# Patient Record
Sex: FEMALE | Race: BLACK OR AFRICAN AMERICAN | NOT HISPANIC OR LATINO | URBAN - METROPOLITAN AREA
[De-identification: names, ages, dates, MRNs, and addresses within clinical notes are randomized per-mention and may not be internally consistent; named-entity substitution may affect disease eponyms.]

---

## 2023-09-07 ENCOUNTER — OFFICE VISIT (OUTPATIENT)
Dept: PRIMARY CARE | Facility: CLINIC | Age: 83
End: 2023-09-07
Payer: MEDICARE

## 2023-09-07 VITALS
HEART RATE: 50 BPM | TEMPERATURE: 97.4 F | BODY MASS INDEX: 27.65 KG/M2 | WEIGHT: 141.6 LBS | SYSTOLIC BLOOD PRESSURE: 153 MMHG | DIASTOLIC BLOOD PRESSURE: 58 MMHG

## 2023-09-07 DIAGNOSIS — F32.A DEPRESSION, UNSPECIFIED DEPRESSION TYPE: ICD-10-CM

## 2023-09-07 DIAGNOSIS — K21.9 GASTROESOPHAGEAL REFLUX DISEASE WITHOUT ESOPHAGITIS: ICD-10-CM

## 2023-09-07 DIAGNOSIS — E11.9 DIABETES MELLITUS TYPE 2 WITHOUT RETINOPATHY (MULTI): ICD-10-CM

## 2023-09-07 DIAGNOSIS — I42.9 CARDIOMYOPATHY, UNSPECIFIED TYPE (MULTI): ICD-10-CM

## 2023-09-07 DIAGNOSIS — M54.50 CHRONIC LOW BACK PAIN WITHOUT SCIATICA, UNSPECIFIED BACK PAIN LATERALITY: ICD-10-CM

## 2023-09-07 DIAGNOSIS — I50.42 CHRONIC COMBINED SYSTOLIC AND DIASTOLIC HEART FAILURE WITH PRESERVED EJECTION FRACTION (MULTI): ICD-10-CM

## 2023-09-07 DIAGNOSIS — J44.1 ACUTE EXACERBATION OF COPD WITH ASTHMA (MULTI): ICD-10-CM

## 2023-09-07 DIAGNOSIS — G89.29 CHRONIC LOW BACK PAIN WITHOUT SCIATICA, UNSPECIFIED BACK PAIN LATERALITY: ICD-10-CM

## 2023-09-07 DIAGNOSIS — I10 UNCONTROLLED HYPERTENSION: ICD-10-CM

## 2023-09-07 DIAGNOSIS — C34.90 MALIGNANT NEOPLASM OF LUNG, UNSPECIFIED LATERALITY, UNSPECIFIED PART OF LUNG (MULTI): ICD-10-CM

## 2023-09-07 DIAGNOSIS — I27.20 PULMONARY HYPERTENSION (MULTI): Primary | ICD-10-CM

## 2023-09-07 DIAGNOSIS — J45.901 ACUTE EXACERBATION OF COPD WITH ASTHMA (MULTI): ICD-10-CM

## 2023-09-07 PROBLEM — J44.9 COPD (CHRONIC OBSTRUCTIVE PULMONARY DISEASE) (MULTI): Status: ACTIVE | Noted: 2023-09-07

## 2023-09-07 PROBLEM — R56.9 SEIZURE (MULTI): Status: ACTIVE | Noted: 2023-09-07

## 2023-09-07 PROBLEM — G47.00 INSOMNIA: Status: ACTIVE | Noted: 2023-09-07

## 2023-09-07 PROBLEM — M10.9 ACUTE GOUTY ARTHROPATHY: Status: ACTIVE | Noted: 2023-09-07

## 2023-09-07 PROBLEM — D50.9 IRON DEFICIENCY ANEMIA: Status: ACTIVE | Noted: 2023-09-07

## 2023-09-07 PROBLEM — N18.5 STAGE 5 CHRONIC KIDNEY DISEASE (MULTI): Status: ACTIVE | Noted: 2023-09-07

## 2023-09-07 PROCEDURE — 3078F DIAST BP <80 MM HG: CPT | Performed by: FAMILY MEDICINE

## 2023-09-07 PROCEDURE — 1036F TOBACCO NON-USER: CPT | Performed by: FAMILY MEDICINE

## 2023-09-07 PROCEDURE — 3077F SYST BP >= 140 MM HG: CPT | Performed by: FAMILY MEDICINE

## 2023-09-07 PROCEDURE — 1126F AMNT PAIN NOTED NONE PRSNT: CPT | Performed by: FAMILY MEDICINE

## 2023-09-07 PROCEDURE — 1159F MED LIST DOCD IN RCRD: CPT | Performed by: FAMILY MEDICINE

## 2023-09-07 PROCEDURE — 99214 OFFICE O/P EST MOD 30 MIN: CPT | Performed by: FAMILY MEDICINE

## 2023-09-07 RX ORDER — EPINEPHRINE 0.3 MG/.3ML
0.3 INJECTION SUBCUTANEOUS
COMMUNITY
Start: 2022-02-14 | End: 2023-09-07 | Stop reason: SDUPTHER

## 2023-09-07 RX ORDER — MONTELUKAST SODIUM 10 MG/1
1 TABLET ORAL DAILY
COMMUNITY
Start: 2013-07-11 | End: 2023-09-07 | Stop reason: SDUPTHER

## 2023-09-07 RX ORDER — ISOSORBIDE MONONITRATE 30 MG/1
1 TABLET, EXTENDED RELEASE ORAL DAILY
COMMUNITY
Start: 2022-07-14 | End: 2023-09-07 | Stop reason: SDUPTHER

## 2023-09-07 RX ORDER — POTASSIUM CHLORIDE 750 MG/1
1 TABLET, EXTENDED RELEASE ORAL DAILY
COMMUNITY
Start: 2022-08-09 | End: 2023-09-07 | Stop reason: SDUPTHER

## 2023-09-07 RX ORDER — ASPIRIN 81 MG/1
1 TABLET ORAL DAILY
COMMUNITY
Start: 2019-03-11 | End: 2023-09-07 | Stop reason: SDUPTHER

## 2023-09-07 RX ORDER — AMLODIPINE BESYLATE 10 MG/1
1 TABLET ORAL DAILY
COMMUNITY
Start: 2017-08-04 | End: 2023-09-07 | Stop reason: SDUPTHER

## 2023-09-07 RX ORDER — ROSUVASTATIN CALCIUM 40 MG/1
40 TABLET, COATED ORAL NIGHTLY
Qty: 90 TABLET | Refills: 1 | Status: SHIPPED | OUTPATIENT
Start: 2023-09-07 | End: 2024-01-28

## 2023-09-07 RX ORDER — SITAGLIPTIN 100 MG/1
1 TABLET, FILM COATED ORAL DAILY
COMMUNITY
Start: 2013-07-11 | End: 2023-09-07 | Stop reason: DRUGHIGH

## 2023-09-07 RX ORDER — CARVEDILOL 12.5 MG/1
12.5 TABLET ORAL 2 TIMES DAILY
Qty: 180 TABLET | Refills: 1 | Status: SHIPPED | OUTPATIENT
Start: 2023-09-07 | End: 2024-01-28

## 2023-09-07 RX ORDER — DIPHENHYDRAMINE HCL 50 MG
50 CAPSULE ORAL 2 TIMES DAILY
Qty: 90 CAPSULE | Refills: 1 | Status: SHIPPED | OUTPATIENT
Start: 2023-09-07 | End: 2024-03-31

## 2023-09-07 RX ORDER — DIPHENHYDRAMINE HCL 50 MG
1 CAPSULE ORAL 2 TIMES DAILY
COMMUNITY
Start: 2021-09-27 | End: 2023-09-07 | Stop reason: SDUPTHER

## 2023-09-07 RX ORDER — FUROSEMIDE 40 MG/1
TABLET ORAL
COMMUNITY
Start: 2022-07-14 | End: 2023-09-07 | Stop reason: SDUPTHER

## 2023-09-07 RX ORDER — SITAGLIPTIN 100 MG/1
100 TABLET, FILM COATED ORAL DAILY
Qty: 90 TABLET | Refills: 1 | Status: SHIPPED | OUTPATIENT
Start: 2023-09-07 | End: 2023-09-07 | Stop reason: SDUPTHER

## 2023-09-07 RX ORDER — ALBUTEROL SULFATE 0.83 MG/ML
2.5 SOLUTION RESPIRATORY (INHALATION) EVERY 4 HOURS PRN
Qty: 75 ML | Refills: 5 | Status: SHIPPED | OUTPATIENT
Start: 2023-09-07 | End: 2024-01-28

## 2023-09-07 RX ORDER — AMLODIPINE BESYLATE 10 MG/1
10 TABLET ORAL DAILY
Qty: 90 TABLET | Refills: 1 | Status: SHIPPED | OUTPATIENT
Start: 2023-09-07 | End: 2024-01-28

## 2023-09-07 RX ORDER — ROSUVASTATIN CALCIUM 40 MG/1
1 TABLET, COATED ORAL NIGHTLY
COMMUNITY
Start: 2021-04-09 | End: 2023-09-07 | Stop reason: SDUPTHER

## 2023-09-07 RX ORDER — ISOSORBIDE MONONITRATE 30 MG/1
30 TABLET, EXTENDED RELEASE ORAL DAILY
Qty: 90 TABLET | Refills: 1 | Status: SHIPPED | OUTPATIENT
Start: 2023-09-07 | End: 2024-01-28

## 2023-09-07 RX ORDER — FUROSEMIDE 40 MG/1
40 TABLET ORAL DAILY
Qty: 90 TABLET | Refills: 1 | Status: SHIPPED | OUTPATIENT
Start: 2023-09-07 | End: 2024-01-28

## 2023-09-07 RX ORDER — EPINEPHRINE 0.3 MG/.3ML
0.3 INJECTION SUBCUTANEOUS AS NEEDED
Qty: 2 ML | Refills: 1 | Status: SHIPPED | OUTPATIENT
Start: 2023-09-07 | End: 2023-11-10

## 2023-09-07 RX ORDER — HYDROCODONE BITARTRATE AND ACETAMINOPHEN 7.5; 325 MG/1; MG/1
1 TABLET ORAL EVERY 6 HOURS PRN
Qty: 90 TABLET | Refills: 0 | Status: SHIPPED | OUTPATIENT
Start: 2023-09-07 | End: 2024-02-20 | Stop reason: SDUPTHER

## 2023-09-07 RX ORDER — SITAGLIPTIN 100 MG/1
100 TABLET, FILM COATED ORAL DAILY
Qty: 90 TABLET | Refills: 1 | Status: SHIPPED | OUTPATIENT
Start: 2023-09-07 | End: 2024-01-28

## 2023-09-07 RX ORDER — MONTELUKAST SODIUM 10 MG/1
10 TABLET ORAL DAILY
Qty: 90 TABLET | Refills: 1 | Status: SHIPPED | OUTPATIENT
Start: 2023-09-07 | End: 2024-01-28

## 2023-09-07 RX ORDER — CARVEDILOL 12.5 MG/1
1 TABLET ORAL 2 TIMES DAILY
COMMUNITY
Start: 2014-07-22 | End: 2023-09-07 | Stop reason: SDUPTHER

## 2023-09-07 RX ORDER — POTASSIUM CHLORIDE 750 MG/1
10 TABLET, EXTENDED RELEASE ORAL DAILY
Qty: 90 TABLET | Refills: 1 | Status: SHIPPED | OUTPATIENT
Start: 2023-09-07 | End: 2024-01-28

## 2023-09-07 RX ORDER — ALBUTEROL SULFATE 0.83 MG/ML
SOLUTION RESPIRATORY (INHALATION)
COMMUNITY
Start: 2014-04-21 | End: 2023-09-07 | Stop reason: SDUPTHER

## 2023-09-07 RX ORDER — HYDROCODONE BITARTRATE AND ACETAMINOPHEN 7.5; 325 MG/1; MG/1
TABLET ORAL
COMMUNITY
Start: 2013-09-03 | End: 2023-09-07 | Stop reason: SDUPTHER

## 2023-09-07 RX ORDER — ASPIRIN 81 MG/1
81 TABLET ORAL DAILY
Qty: 90 TABLET | Refills: 1 | Status: SHIPPED | OUTPATIENT
Start: 2023-09-07

## 2023-09-07 ASSESSMENT — PATIENT HEALTH QUESTIONNAIRE - PHQ9
1. LITTLE INTEREST OR PLEASURE IN DOING THINGS: NOT AT ALL
SUM OF ALL RESPONSES TO PHQ9 QUESTIONS 1 AND 2: 0
2. FEELING DOWN, DEPRESSED OR HOPELESS: NOT AT ALL

## 2023-09-07 ASSESSMENT — ENCOUNTER SYMPTOMS
LOSS OF SENSATION IN FEET: 0
OCCASIONAL FEELINGS OF UNSTEADINESS: 0
DEPRESSION: 0

## 2023-09-07 NOTE — PROGRESS NOTES
Subjective   Patient ID: Bailey Del Roasrio is a 83 y.o. female who presents for Follow-up and Med Refill.  Med Refill  The patient is a 83 years of,  female with a history of lung cancer s/p left upper lobectomy '93 and chemotherapy, asthma, COPD, CHF w/ EF 30- 35%, severe pulmonary hypertension (67 mmHg), depression, diabetes mellitus II, hypercholesterolemia, hypertension, renal insufficiency, who is here for a routine follow-up visit and medication refills.    A review of system was completed.  All systems were reviewed and were normal, except for the ones that are listed in the HPI.    Objective   Physical Exam  Constitutional:       Appearance: Normal appearance.   HENT:      Head: Normocephalic and atraumatic.      Right Ear: Tympanic membrane, ear canal and external ear normal.      Left Ear: Tympanic membrane, ear canal and external ear normal.      Nose: Nose normal.      Mouth/Throat:      Mouth: Mucous membranes are moist.      Pharynx: Oropharynx is clear.   Eyes:      Extraocular Movements: Extraocular movements intact.      Conjunctiva/sclera: Conjunctivae normal.      Pupils: Pupils are equal, round, and reactive to light.   Cardiovascular:      Rate and Rhythm: Normal rate and regular rhythm.      Pulses: Normal pulses.   Pulmonary:      Effort: Pulmonary effort is normal.      Breath sounds: Normal breath sounds.   Abdominal:      General: Abdomen is flat. Bowel sounds are normal.      Palpations: Abdomen is soft.   Musculoskeletal:         General: Normal range of motion.      Cervical back: Normal range of motion and neck supple.   Skin:     General: Skin is warm.   Neurological:      General: No focal deficit present.      Mental Status: She is alert and oriented to person, place, and time. Mental status is at baseline.   Psychiatric:         Mood and Affect: Mood normal.         Behavior: Behavior normal.         Thought Content: Thought content normal.         Judgment:  Judgment normal.   Assessment/Plan   Problem List Items Addressed This Visit       Acute exacerbation of COPD with asthma (CMS/HCC)     -not controlled because the patient stopped using her Trelegy inhaler.  Reason not disclosed.          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 (CMS/HCC)    Relevant Medications    amLODIPine (Norvasc) 10 mg tablet    carvedilol (Coreg) 12.5 mg tablet    EPINEPHrine 0.3 mg/0.3 mL injection syringe    isosorbide mononitrate ER (Imdur) 30 mg 24 hr tablet    Depression    Diabetes mellitus type 2 without retinopathy (CMS/HCC)    Relevant Medications    EPINEPHrine 0.3 mg/0.3 mL injection syringe    Januvia 100 mg tablet    montelukast (Singulair) 10 mg tablet    potassium chloride CR 10 mEq ER tablet    rosuvastatin (Crestor) 40 mg tablet    Other Relevant Orders    CBC    Comprehensive Metabolic Panel    Hemoglobin A1C    Lipid Panel    TSH with reflex to Free T4 if abnormal    Albumin , Urine Random    GERD (gastroesophageal reflux disease)    Relevant Medications    furosemide (Lasix) 40 mg tablet    isosorbide mononitrate ER (Imdur) 30 mg 24 hr tablet    Lung cancer (CMS/HCC)    Pulmonary hypertension (CMS/HCC) - Primary    Uncontrolled hypertension    Relevant Medications    amLODIPine (Norvasc) 10 mg tablet        Patient to return to office in 6 months.

## 2023-09-08 LAB
AMPHETAMINE (PRESENCE) IN URINE BY SCREEN METHOD: NORMAL
BARBITURATES PRESENCE IN URINE BY SCREEN METHOD: NORMAL
BENZODIAZEPINE (PRESENCE) IN URINE BY SCREEN METHOD: NORMAL
CANNABINOIDS IN URINE BY SCREEN METHOD: NORMAL
COCAINE (PRESENCE) IN URINE BY SCREEN METHOD: NORMAL
DRUG SCREEN COMMENT URINE: NORMAL
FENTANYL URINE: NORMAL
OPIATES (PRESENCE) IN URINE BY SCREEN METHOD: NORMAL
OXYCODONE (PRESENCE) IN URINE BY SCREEN METHOD: NORMAL
PHENCYCLIDINE (PRESENCE) IN URINE BY SCREEN METHOD: NORMAL

## 2023-09-08 PROCEDURE — 80307 DRUG TEST PRSMV CHEM ANLYZR: CPT

## 2023-09-12 ENCOUNTER — TELEPHONE (OUTPATIENT)
Dept: PRIMARY CARE | Facility: CLINIC | Age: 83
End: 2023-09-12

## 2023-09-12 DIAGNOSIS — J44.1 ACUTE EXACERBATION OF COPD WITH ASTHMA (MULTI): Primary | ICD-10-CM

## 2023-09-12 DIAGNOSIS — J45.901 ACUTE EXACERBATION OF COPD WITH ASTHMA (MULTI): Primary | ICD-10-CM

## 2023-09-12 RX ORDER — DOXYCYCLINE 100 MG/1
100 CAPSULE ORAL 2 TIMES DAILY
Qty: 20 CAPSULE | Refills: 0 | Status: SHIPPED | OUTPATIENT
Start: 2023-09-12 | End: 2023-09-22

## 2023-09-12 RX ORDER — PREDNISONE 10 MG/1
TABLET ORAL
Qty: 42 TABLET | Refills: 0 | Status: SHIPPED | OUTPATIENT
Start: 2023-09-12 | End: 2023-09-24

## 2023-09-12 NOTE — TELEPHONE ENCOUNTER
Daughter states that she would like for you to call her mom in some prednisone because her breathing is not getting any better and that they are not seeing the specialist anymore.

## 2023-10-27 DIAGNOSIS — J44.1 ACUTE EXACERBATION OF COPD WITH ASTHMA (MULTI): ICD-10-CM

## 2023-10-27 DIAGNOSIS — J45.901 ACUTE EXACERBATION OF COPD WITH ASTHMA (MULTI): ICD-10-CM

## 2023-10-27 DIAGNOSIS — E11.9 DIABETES MELLITUS TYPE 2 WITHOUT RETINOPATHY (MULTI): ICD-10-CM

## 2023-11-10 RX ORDER — EPINEPHRINE 0.3 MG/.3ML
0.3 INJECTION SUBCUTANEOUS AS NEEDED
Qty: 2 EACH | Refills: 2 | Status: SHIPPED | OUTPATIENT
Start: 2023-11-10

## 2024-01-24 DIAGNOSIS — E11.9 DIABETES MELLITUS TYPE 2 WITHOUT RETINOPATHY (MULTI): Primary | ICD-10-CM

## 2024-01-24 RX ORDER — LANCETS
EACH MISCELLANEOUS
COMMUNITY
Start: 2023-06-09

## 2024-01-24 RX ORDER — BLOOD SUGAR DIAGNOSTIC
STRIP MISCELLANEOUS
COMMUNITY
Start: 2021-03-11 | End: 2024-01-24 | Stop reason: SDUPTHER

## 2024-01-25 DIAGNOSIS — E11.9 DIABETES MELLITUS TYPE 2 WITHOUT RETINOPATHY (MULTI): ICD-10-CM

## 2024-01-25 RX ORDER — BLOOD SUGAR DIAGNOSTIC
STRIP MISCELLANEOUS
Qty: 100 STRIP | Refills: 3 | Status: SHIPPED | OUTPATIENT
Start: 2024-01-25 | End: 2024-04-26

## 2024-01-26 DIAGNOSIS — E11.9 DIABETES MELLITUS TYPE 2 WITHOUT RETINOPATHY (MULTI): ICD-10-CM

## 2024-01-26 DIAGNOSIS — I10 UNCONTROLLED HYPERTENSION: ICD-10-CM

## 2024-01-26 DIAGNOSIS — K21.9 GASTROESOPHAGEAL REFLUX DISEASE WITHOUT ESOPHAGITIS: ICD-10-CM

## 2024-01-26 DIAGNOSIS — I42.9 CARDIOMYOPATHY, UNSPECIFIED TYPE (MULTI): ICD-10-CM

## 2024-01-26 DIAGNOSIS — J44.1 ACUTE EXACERBATION OF COPD WITH ASTHMA (MULTI): ICD-10-CM

## 2024-01-26 DIAGNOSIS — J45.901 ACUTE EXACERBATION OF COPD WITH ASTHMA (MULTI): ICD-10-CM

## 2024-01-28 RX ORDER — AMLODIPINE BESYLATE 10 MG/1
10 TABLET ORAL DAILY
Qty: 90 TABLET | Refills: 0 | Status: SHIPPED | OUTPATIENT
Start: 2024-01-28 | End: 2024-04-26

## 2024-01-28 RX ORDER — ROSUVASTATIN CALCIUM 40 MG/1
40 TABLET, COATED ORAL NIGHTLY
Qty: 90 TABLET | Refills: 0 | Status: SHIPPED | OUTPATIENT
Start: 2024-01-28 | End: 2024-04-26

## 2024-01-28 RX ORDER — ISOSORBIDE MONONITRATE 30 MG/1
30 TABLET, EXTENDED RELEASE ORAL DAILY
Qty: 90 TABLET | Refills: 0 | Status: SHIPPED | OUTPATIENT
Start: 2024-01-28 | End: 2024-04-26

## 2024-01-28 RX ORDER — FUROSEMIDE 40 MG/1
40 TABLET ORAL DAILY
Qty: 90 TABLET | Refills: 0 | Status: SHIPPED | OUTPATIENT
Start: 2024-01-28 | End: 2024-04-26

## 2024-01-28 RX ORDER — POTASSIUM CHLORIDE 750 MG/1
10 TABLET, EXTENDED RELEASE ORAL DAILY
Qty: 90 TABLET | Refills: 0 | Status: SHIPPED | OUTPATIENT
Start: 2024-01-28 | End: 2024-04-26

## 2024-01-28 RX ORDER — CARVEDILOL 12.5 MG/1
12.5 TABLET ORAL 2 TIMES DAILY
Qty: 180 TABLET | Refills: 0 | Status: SHIPPED | OUTPATIENT
Start: 2024-01-28 | End: 2024-04-26

## 2024-01-28 RX ORDER — SITAGLIPTIN 100 MG/1
100 TABLET, FILM COATED ORAL DAILY
Qty: 90 TABLET | Refills: 0 | Status: SHIPPED | OUTPATIENT
Start: 2024-01-28 | End: 2024-04-26

## 2024-01-28 RX ORDER — MONTELUKAST SODIUM 10 MG/1
10 TABLET ORAL DAILY
Qty: 90 TABLET | Refills: 0 | Status: SHIPPED | OUTPATIENT
Start: 2024-01-28 | End: 2024-04-26

## 2024-01-28 RX ORDER — ALBUTEROL SULFATE 0.83 MG/ML
2.5 SOLUTION RESPIRATORY (INHALATION) EVERY 4 HOURS PRN
Qty: 75 ML | Refills: 2 | Status: SHIPPED | OUTPATIENT
Start: 2024-01-28 | End: 2024-04-26

## 2024-01-30 DIAGNOSIS — J44.9 CHRONIC OBSTRUCTIVE PULMONARY DISEASE, UNSPECIFIED COPD TYPE (MULTI): Primary | ICD-10-CM

## 2024-01-30 RX ORDER — FLUTICASONE FUROATE, UMECLIDINIUM BROMIDE AND VILANTEROL TRIFENATATE 100; 62.5; 25 UG/1; UG/1; UG/1
1 POWDER RESPIRATORY (INHALATION) DAILY
COMMUNITY
Start: 2022-02-14 | End: 2024-01-30 | Stop reason: SDUPTHER

## 2024-01-30 RX ORDER — FLUTICASONE FUROATE, UMECLIDINIUM BROMIDE AND VILANTEROL TRIFENATATE 100; 62.5; 25 UG/1; UG/1; UG/1
1 POWDER RESPIRATORY (INHALATION) DAILY
Qty: 1 EACH | Refills: 5 | Status: SHIPPED | OUTPATIENT
Start: 2024-01-30

## 2024-02-19 ENCOUNTER — TELEPHONE (OUTPATIENT)
Dept: PRIMARY CARE | Facility: CLINIC | Age: 84
End: 2024-02-19

## 2024-02-19 DIAGNOSIS — J41.0 SIMPLE CHRONIC BRONCHITIS (MULTI): Primary | ICD-10-CM

## 2024-02-20 ENCOUNTER — TELEMEDICINE (OUTPATIENT)
Dept: PRIMARY CARE | Facility: CLINIC | Age: 84
End: 2024-02-20
Payer: MEDICARE

## 2024-02-20 DIAGNOSIS — M54.50 CHRONIC LOW BACK PAIN WITHOUT SCIATICA, UNSPECIFIED BACK PAIN LATERALITY: ICD-10-CM

## 2024-02-20 DIAGNOSIS — G89.29 CHRONIC LOW BACK PAIN WITHOUT SCIATICA, UNSPECIFIED BACK PAIN LATERALITY: ICD-10-CM

## 2024-02-20 DIAGNOSIS — J41.0 SIMPLE CHRONIC BRONCHITIS (MULTI): Primary | ICD-10-CM

## 2024-02-20 DIAGNOSIS — D50.8 OTHER IRON DEFICIENCY ANEMIA: ICD-10-CM

## 2024-02-20 PROCEDURE — 1036F TOBACCO NON-USER: CPT | Performed by: FAMILY MEDICINE

## 2024-02-20 PROCEDURE — 99214 OFFICE O/P EST MOD 30 MIN: CPT | Performed by: FAMILY MEDICINE

## 2024-02-20 PROCEDURE — 1126F AMNT PAIN NOTED NONE PRSNT: CPT | Performed by: FAMILY MEDICINE

## 2024-02-20 RX ORDER — DOXYCYCLINE 100 MG/1
100 CAPSULE ORAL 2 TIMES DAILY
Qty: 20 CAPSULE | Refills: 0 | Status: SHIPPED | OUTPATIENT
Start: 2024-02-20 | End: 2024-03-01

## 2024-02-20 RX ORDER — PREDNISONE 10 MG/1
TABLET ORAL
Qty: 42 TABLET | Refills: 0 | Status: SHIPPED | OUTPATIENT
Start: 2024-02-20 | End: 2024-03-03

## 2024-02-20 RX ORDER — FERROUS SULFATE 325(65) MG
1 TABLET ORAL 2 TIMES DAILY
Qty: 180 TABLET | Refills: 1 | Status: SHIPPED | OUTPATIENT
Start: 2024-02-20

## 2024-02-20 RX ORDER — HYDROCODONE BITARTRATE AND ACETAMINOPHEN 7.5; 325 MG/1; MG/1
1 TABLET ORAL EVERY 6 HOURS PRN
Qty: 90 TABLET | Refills: 0 | Status: SHIPPED | OUTPATIENT
Start: 2024-02-20 | End: 2024-02-20 | Stop reason: SDUPTHER

## 2024-02-20 RX ORDER — HYDROCODONE BITARTRATE AND ACETAMINOPHEN 7.5; 325 MG/1; MG/1
1 TABLET ORAL EVERY 6 HOURS PRN
Qty: 90 TABLET | Refills: 0 | Status: SHIPPED | OUTPATIENT
Start: 2024-02-20

## 2024-02-20 NOTE — PROGRESS NOTES
Subjective   Patient ID: Bailey Del Rosario is a 83 y.o. female who presents for few concerns.    HPI    The patient is a 82 years of,  female with a history of lung cancer s/p left upper lobectomy '93 and chemotherapy, asthma, COPD, CHF w/ EF 30- 35%, severe pulmonary hypertension (67 mmHg), depression, diabetes mellitus II, hypercholesterolemia, hypertension, renal insufficiency, who is here for:     1. disability placard ordered.  2.  Refill of iron supplement.  3.  Treatment of cough, shortness of breath and wheezing that started few days ago.  4.  Pain medication refilled.  Last urine drug screen was done in September 2023.    A review of system was completed.  All systems were reviewed and were normal, except for the ones that are listed in the HPI.    Objective   Physical Exam    Assessment/Plan   Problem List Items Addressed This Visit       COPD (chronic obstructive pulmonary disease) (CMS/[facility]) - Primary     -Prednisone taper over 2 weeks prescribed.  -Doxycycline 100 mg twice daily for 10 days started.         Relevant Medications    predniSONE (Deltasone) 10 mg tablet    doxycycline (Vibramycin) 100 mg capsule    Other Relevant Orders    Disability Placard    Iron deficiency anemia     Ferrous sulfate refilled.-         Relevant Medications    ferrous sulfate, 325 mg ferrous sulfate, tablet    Chronic low back pain without sciatica    Relevant Medications    HYDROcodone-acetaminophen (Norco) 7.5-325 mg tablet        Patient to return to office for controlled substance agreement.  .

## 2024-02-26 DIAGNOSIS — J41.0 SIMPLE CHRONIC BRONCHITIS (MULTI): ICD-10-CM

## 2024-03-05 RX ORDER — PREDNISONE 10 MG/1
TABLET ORAL
Qty: 42 TABLET | Refills: 0 | OUTPATIENT
Start: 2024-03-05

## 2024-03-08 ENCOUNTER — TELEPHONE (OUTPATIENT)
Dept: PHARMACY | Facility: HOSPITAL | Age: 84
End: 2024-03-08
Payer: MEDICARE

## 2024-03-08 NOTE — TELEPHONE ENCOUNTER
Population Health: Outreach by Ambulatory Pharmacy Team    Payor: Yang GUERRERO  Reason: Adherence  Medication: Januvia 100mg, Rosuvastatin 40mg  Outcome: Patient Reached: Claims Adherence, no issues. Recently filled meds on 2/22/24. No other concerns    Mary Jo Gregory, PharmD

## 2024-04-01 ENCOUNTER — TELEPHONE (OUTPATIENT)
Dept: PRIMARY CARE | Facility: CLINIC | Age: 84
End: 2024-04-01

## 2024-04-09 ENCOUNTER — APPOINTMENT (OUTPATIENT)
Dept: PRIMARY CARE | Facility: CLINIC | Age: 84
End: 2024-04-09
Payer: MEDICARE

## 2024-05-17 DIAGNOSIS — K21.9 GASTROESOPHAGEAL REFLUX DISEASE WITHOUT ESOPHAGITIS: ICD-10-CM

## 2024-05-17 DIAGNOSIS — I42.9 CARDIOMYOPATHY, UNSPECIFIED TYPE (MULTI): ICD-10-CM

## 2024-05-17 DIAGNOSIS — I10 UNCONTROLLED HYPERTENSION: ICD-10-CM

## 2024-05-17 DIAGNOSIS — E11.9 DIABETES MELLITUS TYPE 2 WITHOUT RETINOPATHY (MULTI): ICD-10-CM

## 2024-05-18 RX ORDER — ISOSORBIDE MONONITRATE 30 MG/1
30 TABLET, EXTENDED RELEASE ORAL DAILY
Qty: 30 TABLET | Refills: 0 | Status: SHIPPED | OUTPATIENT
Start: 2024-05-18

## 2024-05-18 RX ORDER — ROSUVASTATIN CALCIUM 40 MG/1
40 TABLET, COATED ORAL NIGHTLY
Qty: 30 TABLET | Refills: 0 | Status: SHIPPED | OUTPATIENT
Start: 2024-05-18

## 2024-05-18 RX ORDER — POTASSIUM CHLORIDE 750 MG/1
10 TABLET, EXTENDED RELEASE ORAL DAILY
Qty: 30 TABLET | Refills: 0 | Status: SHIPPED | OUTPATIENT
Start: 2024-05-18

## 2024-05-18 RX ORDER — CARVEDILOL 12.5 MG/1
12.5 TABLET ORAL 2 TIMES DAILY
Qty: 60 TABLET | Refills: 0 | Status: SHIPPED | OUTPATIENT
Start: 2024-05-18

## 2024-05-18 RX ORDER — FUROSEMIDE 40 MG/1
40 TABLET ORAL DAILY
Qty: 30 TABLET | Refills: 0 | Status: SHIPPED | OUTPATIENT
Start: 2024-05-18

## 2024-05-18 RX ORDER — MONTELUKAST SODIUM 10 MG/1
10 TABLET ORAL DAILY
Qty: 30 TABLET | Refills: 0 | Status: SHIPPED | OUTPATIENT
Start: 2024-05-18

## 2024-05-18 RX ORDER — AMLODIPINE BESYLATE 10 MG/1
10 TABLET ORAL DAILY
Qty: 30 TABLET | Refills: 0 | Status: SHIPPED | OUTPATIENT
Start: 2024-05-18

## 2024-05-18 RX ORDER — SITAGLIPTIN 100 MG/1
100 TABLET, FILM COATED ORAL DAILY
Qty: 30 TABLET | Refills: 0 | Status: SHIPPED | OUTPATIENT
Start: 2024-05-18

## 2024-05-22 ENCOUNTER — TELEPHONE (OUTPATIENT)
Dept: PRIMARY CARE | Facility: CLINIC | Age: 84
End: 2024-05-22

## 2024-05-22 NOTE — TELEPHONE ENCOUNTER
Patients daughter segundo stating she has been waiting for a prescription to be sent over for a oxygen tank prescription should be sent to a [facility] [facility] phone number [phone] spoke with someone at the company name [employer]

## 2024-06-03 ENCOUNTER — OFFICE VISIT (OUTPATIENT)
Dept: PRIMARY CARE | Facility: CLINIC | Age: 84
End: 2024-06-03
Payer: MEDICARE

## 2024-06-03 VITALS
BODY MASS INDEX: 27.34 KG/M2 | SYSTOLIC BLOOD PRESSURE: 145 MMHG | WEIGHT: 140 LBS | HEART RATE: 60 BPM | DIASTOLIC BLOOD PRESSURE: 67 MMHG | TEMPERATURE: 96.9 F

## 2024-06-03 DIAGNOSIS — J44.1 ACUTE EXACERBATION OF COPD WITH ASTHMA (MULTI): Primary | ICD-10-CM

## 2024-06-03 DIAGNOSIS — G89.29 CHRONIC PAIN OF LEFT WRIST: ICD-10-CM

## 2024-06-03 DIAGNOSIS — J45.901 ACUTE EXACERBATION OF COPD WITH ASTHMA (MULTI): Primary | ICD-10-CM

## 2024-06-03 DIAGNOSIS — M25.532 CHRONIC PAIN OF LEFT WRIST: ICD-10-CM

## 2024-06-03 PROCEDURE — 1036F TOBACCO NON-USER: CPT | Performed by: FAMILY MEDICINE

## 2024-06-03 PROCEDURE — 99214 OFFICE O/P EST MOD 30 MIN: CPT | Performed by: FAMILY MEDICINE

## 2024-06-03 PROCEDURE — 1159F MED LIST DOCD IN RCRD: CPT | Performed by: FAMILY MEDICINE

## 2024-06-03 PROCEDURE — 3077F SYST BP >= 140 MM HG: CPT | Performed by: FAMILY MEDICINE

## 2024-06-03 PROCEDURE — 3078F DIAST BP <80 MM HG: CPT | Performed by: FAMILY MEDICINE

## 2024-06-03 PROCEDURE — 1126F AMNT PAIN NOTED NONE PRSNT: CPT | Performed by: FAMILY MEDICINE

## 2024-06-03 RX ORDER — DICLOFENAC SODIUM 10 MG/G
4 GEL TOPICAL 4 TIMES DAILY
Qty: 100 G | Refills: 1 | Status: SHIPPED | OUTPATIENT
Start: 2024-06-03

## 2024-06-03 RX ORDER — METHYLPREDNISOLONE 4 MG/1
TABLET ORAL
Qty: 42 TABLET | Refills: 0 | Status: SHIPPED | OUTPATIENT
Start: 2024-06-03 | End: 2024-06-10

## 2024-06-03 RX ORDER — DOXYCYCLINE 100 MG/1
100 CAPSULE ORAL 2 TIMES DAILY
Qty: 20 CAPSULE | Refills: 0 | Status: SHIPPED | OUTPATIENT
Start: 2024-06-03 | End: 2024-06-13

## 2024-06-03 ASSESSMENT — PAIN SCALES - GENERAL: PAINLEVEL: 0-NO PAIN

## 2024-06-03 NOTE — ASSESSMENT & PLAN NOTE
-Oxygen concentrator ordered.   -Medrol dose pack and Doxycycline 100 mg BID for 10 days started.   -Disability placard given today.

## 2024-06-03 NOTE — PROGRESS NOTES
Subjective   Patient ID: Bailey Del Rosario is a 84 y.o. female who presents for Follow-up (6 month follow up ).  HPI    The patient is a 84 years of,  female with a history of lung cancer s/p left upper lobectomy '93 and chemotherapy, asthma, COPD, CHF w/ EF 30- 35%, severe pulmonary hypertension (67 mmHg), depression, diabetes mellitus II, hypercholesterolemia, hypertension, renal insufficiency, who is here for:     1. A prescription for a portable oxygen tank, needed for a cruise that they are planing to take in few weeks.  Her daughter is also requesting a treatment of cough, shortness of breath and wheezing incase she gets sick on the cruise.    2- left wrist pain and edema for few days.  No associated local trauma.     A review of system was completed.  All systems were reviewed and were normal, except for the ones that are listed in the HPI.    Objective   Physical Exam  Constitutional:       Appearance: Normal appearance.   HENT:      Head: Normocephalic and atraumatic.      Right Ear: Tympanic membrane, ear canal and external ear normal.      Left Ear: Tympanic membrane, ear canal and external ear normal.      Nose: Nose normal.      Mouth/Throat:      Mouth: Mucous membranes are moist.      Pharynx: Oropharynx is clear.   Eyes:      Extraocular Movements: Extraocular movements intact.      Conjunctiva/sclera: Conjunctivae normal.      Pupils: Pupils are equal, round, and reactive to light.   Cardiovascular:      Rate and Rhythm: Normal rate and regular rhythm.      Pulses: Normal pulses.   Pulmonary:      Effort: Pulmonary effort is normal.      Breath sounds: Normal breath sounds.   Abdominal:      General: Abdomen is flat. Bowel sounds are normal.      Palpations: Abdomen is soft.   Musculoskeletal:         General: Normal range of motion.      Cervical back: Normal range of motion and neck supple.   Skin:     General: Skin is warm.   Neurological:      General: No focal deficit present.       Mental Status: She is alert and oriented to person, place, and time. Mental status is at baseline.   Psychiatric:         Mood and Affect: Mood normal.         Behavior: Behavior normal.         Thought Content: Thought content normal.         Judgment: Judgment normal.     Assessment/Plan   Problem List Items Addressed This Visit       Acute exacerbation of COPD with asthma (Multi) - Primary     -Oxygen concentrator ordered.   -Medrol dose pack and Doxycycline 100 mg BID for 10 days started.   -Disability placard given today.          Relevant Medications    methylPREDNISolone (Medrol Dospak) 4 mg tablets    doxycycline (Vibramycin) 100 mg capsule    Other Relevant Orders    Disability Placard    Chronic pain of left wrist     -Diclofenac gel PRN BID started.          Relevant Medications    diclofenac sodium (Voltaren) 1 % gel     Patient to return to office in September for her MWV.

## 2024-06-19 DIAGNOSIS — I10 UNCONTROLLED HYPERTENSION: ICD-10-CM

## 2024-06-19 DIAGNOSIS — I42.9 CARDIOMYOPATHY, UNSPECIFIED TYPE (MULTI): ICD-10-CM

## 2024-06-19 DIAGNOSIS — E11.9 DIABETES MELLITUS TYPE 2 WITHOUT RETINOPATHY (MULTI): ICD-10-CM

## 2024-06-19 DIAGNOSIS — K21.9 GASTROESOPHAGEAL REFLUX DISEASE WITHOUT ESOPHAGITIS: ICD-10-CM

## 2024-06-20 RX ORDER — CARVEDILOL 12.5 MG/1
12.5 TABLET ORAL 2 TIMES DAILY
Qty: 60 TABLET | Refills: 0 | Status: SHIPPED | OUTPATIENT
Start: 2024-06-20 | End: 2024-06-20

## 2024-06-20 RX ORDER — SITAGLIPTIN 100 MG/1
100 TABLET, FILM COATED ORAL DAILY
Qty: 90 TABLET | Refills: 0 | Status: SHIPPED | OUTPATIENT
Start: 2024-06-20

## 2024-06-20 RX ORDER — ISOSORBIDE MONONITRATE 30 MG/1
30 TABLET, EXTENDED RELEASE ORAL DAILY
Qty: 30 TABLET | Refills: 0 | Status: SHIPPED | OUTPATIENT
Start: 2024-06-20 | End: 2024-06-20

## 2024-06-20 RX ORDER — ASPIRIN 81 MG/1
81 TABLET ORAL DAILY
Qty: 30 TABLET | Refills: 1 | Status: SHIPPED | OUTPATIENT
Start: 2024-06-20 | End: 2024-06-20

## 2024-06-20 RX ORDER — MONTELUKAST SODIUM 10 MG/1
10 TABLET ORAL DAILY
Qty: 30 TABLET | Refills: 0 | Status: SHIPPED | OUTPATIENT
Start: 2024-06-20 | End: 2024-06-20

## 2024-06-20 RX ORDER — ISOSORBIDE MONONITRATE 30 MG/1
30 TABLET, EXTENDED RELEASE ORAL DAILY
Qty: 90 TABLET | Refills: 1 | Status: SHIPPED | OUTPATIENT
Start: 2024-06-20

## 2024-06-20 RX ORDER — FUROSEMIDE 40 MG/1
40 TABLET ORAL DAILY
Qty: 90 TABLET | Refills: 1 | Status: SHIPPED | OUTPATIENT
Start: 2024-06-20

## 2024-06-20 RX ORDER — POTASSIUM CHLORIDE 750 MG/1
10 TABLET, EXTENDED RELEASE ORAL DAILY
Qty: 30 TABLET | Refills: 0 | Status: SHIPPED | OUTPATIENT
Start: 2024-06-20

## 2024-06-20 RX ORDER — ROSUVASTATIN CALCIUM 40 MG/1
40 TABLET, COATED ORAL NIGHTLY
Qty: 90 TABLET | Refills: 0 | Status: SHIPPED | OUTPATIENT
Start: 2024-06-20

## 2024-06-20 RX ORDER — MONTELUKAST SODIUM 10 MG/1
10 TABLET ORAL DAILY
Qty: 90 TABLET | Refills: 1 | Status: SHIPPED | OUTPATIENT
Start: 2024-06-20

## 2024-06-20 RX ORDER — AMLODIPINE BESYLATE 10 MG/1
10 TABLET ORAL DAILY
Qty: 30 TABLET | Refills: 0 | Status: SHIPPED | OUTPATIENT
Start: 2024-06-20 | End: 2024-06-20

## 2024-06-20 RX ORDER — AMLODIPINE BESYLATE 10 MG/1
10 TABLET ORAL DAILY
Qty: 90 TABLET | Refills: 1 | Status: SHIPPED | OUTPATIENT
Start: 2024-06-20

## 2024-06-20 RX ORDER — ASPIRIN 81 MG/1
81 TABLET ORAL DAILY
Qty: 90 TABLET | Refills: 1 | Status: SHIPPED | OUTPATIENT
Start: 2024-06-20

## 2024-06-20 RX ORDER — CARVEDILOL 12.5 MG/1
12.5 TABLET ORAL 2 TIMES DAILY
Qty: 180 TABLET | Refills: 1 | Status: SHIPPED | OUTPATIENT
Start: 2024-06-20

## 2024-06-20 RX ORDER — FUROSEMIDE 40 MG/1
40 TABLET ORAL DAILY
Qty: 30 TABLET | Refills: 0 | Status: SHIPPED | OUTPATIENT
Start: 2024-06-20 | End: 2024-06-20

## 2024-07-05 ENCOUNTER — TELEMEDICINE (OUTPATIENT)
Dept: PRIMARY CARE | Facility: CLINIC | Age: 84
End: 2024-07-05
Payer: MEDICARE

## 2024-07-05 ENCOUNTER — TELEPHONE (OUTPATIENT)
Dept: PRIMARY CARE | Facility: CLINIC | Age: 84
End: 2024-07-05

## 2024-07-05 DIAGNOSIS — J45.901 ACUTE EXACERBATION OF COPD WITH ASTHMA (MULTI): Primary | ICD-10-CM

## 2024-07-05 DIAGNOSIS — J44.1 ACUTE EXACERBATION OF COPD WITH ASTHMA (MULTI): Primary | ICD-10-CM

## 2024-07-05 PROCEDURE — 99214 OFFICE O/P EST MOD 30 MIN: CPT | Performed by: FAMILY MEDICINE

## 2024-07-05 RX ORDER — DOXYCYCLINE 100 MG/1
100 CAPSULE ORAL 2 TIMES DAILY
Qty: 20 CAPSULE | Refills: 0 | Status: SHIPPED | OUTPATIENT
Start: 2024-07-05 | End: 2024-07-15

## 2024-07-05 RX ORDER — PREDNISONE 10 MG/1
TABLET ORAL DAILY
Qty: 42 TABLET | Refills: 0 | Status: SHIPPED | OUTPATIENT
Start: 2024-07-05 | End: 2024-07-17

## 2024-07-05 NOTE — ASSESSMENT & PLAN NOTE
-Not controlled.  -Prednisone taper started today.  -Doxycycline 100 mg daily refilled.  -Oxygen as needed recommended.

## 2024-07-05 NOTE — PROGRESS NOTES
Subjective   Patient ID: Bailey Del Rosario is a 84 y.o. female who presents for No chief complaint on file..  HPI    The patient is a 84 years of,  female with a history of lung cancer s/p left upper lobectomy '93 and chemotherapy, asthma, COPD, CHF w/ EF 30- 35%, severe pulmonary hypertension (67 mmHg), depression, diabetes mellitus II, hypercholesterolemia, hypertension, renal insufficiency, who is here for:     1. An exacerbation of her asthma/COPD.  It started when the patient was on vacation.  She began taking doxycycline and Medrol Dosepak as prescribed on June 26, 2024 but has only partially improved.  She still has a productive cough and her chest tightness is significant.  She is still able to speak in full sentences.  Her daughter is unable to measure her pulse ox today stating that it was normal 4 days ago.  Patient is using her oxygen as needed.    A review of system was completed.  All systems were reviewed and were normal, except for the ones that are listed in the HPI.    Objective   Physical Exam  Constitutional:       Appearance: Normal appearance.   HENT:      Head: Normocephalic and atraumatic.      Right Ear: Tympanic membrane, ear canal and external ear normal.      Left Ear: Tympanic membrane, ear canal and external ear normal.      Nose: Nose normal.      Mouth/Throat:      Mouth: Mucous membranes are moist.      Pharynx: Oropharynx is clear.   Eyes:      Extraocular Movements: Extraocular movements intact.      Conjunctiva/sclera: Conjunctivae normal.      Pupils: Pupils are equal, round, and reactive to light.   Cardiovascular:      Rate and Rhythm: Normal rate and regular rhythm.      Pulses: Normal pulses.   Pulmonary:      Effort: Pulmonary effort is normal.      Breath sounds: Normal breath sounds.   Abdominal:      General: Abdomen is flat. Bowel sounds are normal.      Palpations: Abdomen is soft.   Musculoskeletal:         General: Normal range of motion.       Cervical back: Normal range of motion and neck supple.   Skin:     General: Skin is warm.   Neurological:      General: No focal deficit present.      Mental Status: She is alert and oriented to person, place, and time. Mental status is at baseline.   Psychiatric:         Mood and Affect: Mood normal.         Behavior: Behavior normal.         Thought Content: Thought content normal.         Judgment: Judgment normal.     Assessment/Plan   Problem List Items Addressed This Visit       Acute exacerbation of COPD with asthma (Multi) - Primary     -Not controlled.  -Prednisone taper started today.  -Doxycycline 100 mg daily refilled.  -Oxygen as needed recommended.         Relevant Medications    predniSONE (Deltasone) 10 mg tablet    doxycycline (Vibramycin) 100 mg capsule   We recommend a follow-up visit within 1 to 2 weeks if no improvement is noted.

## 2024-07-17 DIAGNOSIS — M25.532 CHRONIC PAIN OF LEFT WRIST: ICD-10-CM

## 2024-07-17 DIAGNOSIS — G89.29 CHRONIC PAIN OF LEFT WRIST: ICD-10-CM

## 2024-07-17 DIAGNOSIS — J45.901 ACUTE EXACERBATION OF COPD WITH ASTHMA (MULTI): ICD-10-CM

## 2024-07-17 DIAGNOSIS — J44.1 ACUTE EXACERBATION OF COPD WITH ASTHMA (MULTI): ICD-10-CM

## 2024-07-18 DIAGNOSIS — E11.9 DIABETES MELLITUS TYPE 2 WITHOUT RETINOPATHY (MULTI): ICD-10-CM

## 2024-07-20 RX ORDER — POTASSIUM CHLORIDE 750 MG/1
10 TABLET, EXTENDED RELEASE ORAL DAILY
Qty: 90 TABLET | Refills: 1 | Status: SHIPPED | OUTPATIENT
Start: 2024-07-20

## 2024-07-20 RX ORDER — DICLOFENAC SODIUM 10 MG/G
4 GEL TOPICAL 4 TIMES DAILY
Qty: 100 G | Refills: 3 | Status: SHIPPED | OUTPATIENT
Start: 2024-07-20

## 2024-07-20 RX ORDER — ALBUTEROL SULFATE 0.83 MG/ML
2.5 SOLUTION RESPIRATORY (INHALATION) EVERY 4 HOURS PRN
Qty: 75 ML | Refills: 2 | Status: SHIPPED | OUTPATIENT
Start: 2024-07-20

## 2024-08-05 DIAGNOSIS — D50.8 OTHER IRON DEFICIENCY ANEMIA: ICD-10-CM

## 2024-08-06 RX ORDER — FERROUS SULFATE TAB 325 MG (65 MG ELEMENTAL FE) 325 (65 FE) MG
1 TAB ORAL 2 TIMES DAILY
Qty: 60 TABLET | Refills: 5 | Status: SHIPPED | OUTPATIENT
Start: 2024-08-06

## 2024-09-16 ENCOUNTER — TELEPHONE (OUTPATIENT)
Dept: PRIMARY CARE | Facility: CLINIC | Age: 84
End: 2024-09-16
Payer: MEDICARE

## 2024-09-19 ENCOUNTER — TELEPHONE (OUTPATIENT)
Dept: PRIMARY CARE | Facility: CLINIC | Age: 84
End: 2024-09-19
Payer: MEDICARE

## 2024-10-01 ENCOUNTER — TELEPHONE (OUTPATIENT)
Dept: PRIMARY CARE | Facility: CLINIC | Age: 84
End: 2024-10-01
Payer: MEDICARE

## 2024-10-16 ENCOUNTER — APPOINTMENT (OUTPATIENT)
Dept: PRIMARY CARE | Facility: CLINIC | Age: 84
End: 2024-10-16
Payer: MEDICARE

## 2024-10-16 VITALS
BODY MASS INDEX: 28.12 KG/M2 | TEMPERATURE: 98 F | HEART RATE: 44 BPM | SYSTOLIC BLOOD PRESSURE: 124 MMHG | DIASTOLIC BLOOD PRESSURE: 64 MMHG | WEIGHT: 144 LBS

## 2024-10-16 DIAGNOSIS — J44.1 ACUTE EXACERBATION OF COPD WITH ASTHMA: ICD-10-CM

## 2024-10-16 DIAGNOSIS — Z00.00 MEDICARE ANNUAL WELLNESS VISIT, SUBSEQUENT: ICD-10-CM

## 2024-10-16 DIAGNOSIS — D50.8 OTHER IRON DEFICIENCY ANEMIA: ICD-10-CM

## 2024-10-16 DIAGNOSIS — G89.29 CHRONIC LOW BACK PAIN WITHOUT SCIATICA, UNSPECIFIED BACK PAIN LATERALITY: ICD-10-CM

## 2024-10-16 DIAGNOSIS — E11.9 DIABETES MELLITUS TYPE 2 WITHOUT RETINOPATHY (MULTI): ICD-10-CM

## 2024-10-16 DIAGNOSIS — E55.9 VITAMIN D DEFICIENCY: ICD-10-CM

## 2024-10-16 DIAGNOSIS — Z79.899 MEDICATION MANAGEMENT: Primary | ICD-10-CM

## 2024-10-16 DIAGNOSIS — M54.50 CHRONIC LOW BACK PAIN WITHOUT SCIATICA, UNSPECIFIED BACK PAIN LATERALITY: ICD-10-CM

## 2024-10-16 PROCEDURE — G0008 ADMIN INFLUENZA VIRUS VAC: HCPCS | Performed by: FAMILY MEDICINE

## 2024-10-16 PROCEDURE — 90662 IIV NO PRSV INCREASED AG IM: CPT | Performed by: FAMILY MEDICINE

## 2024-10-16 PROCEDURE — 99214 OFFICE O/P EST MOD 30 MIN: CPT | Performed by: FAMILY MEDICINE

## 2024-10-16 PROCEDURE — 3074F SYST BP LT 130 MM HG: CPT | Performed by: FAMILY MEDICINE

## 2024-10-16 PROCEDURE — 1159F MED LIST DOCD IN RCRD: CPT | Performed by: FAMILY MEDICINE

## 2024-10-16 PROCEDURE — 1170F FXNL STATUS ASSESSED: CPT | Performed by: FAMILY MEDICINE

## 2024-10-16 PROCEDURE — G0439 PPPS, SUBSEQ VISIT: HCPCS | Performed by: FAMILY MEDICINE

## 2024-10-16 PROCEDURE — 3078F DIAST BP <80 MM HG: CPT | Performed by: FAMILY MEDICINE

## 2024-10-16 RX ORDER — HYDROCODONE BITARTRATE AND ACETAMINOPHEN 7.5; 325 MG/1; MG/1
1 TABLET ORAL EVERY 6 HOURS PRN
Qty: 90 TABLET | Refills: 0 | Status: SHIPPED | OUTPATIENT
Start: 2024-10-16

## 2024-10-16 RX ORDER — FERROUS SULFATE 325(65) MG
1 TABLET ORAL 2 TIMES DAILY
Qty: 60 TABLET | Refills: 5 | Status: SHIPPED | OUTPATIENT
Start: 2024-10-16

## 2024-10-16 RX ORDER — DIPHENHYDRAMINE HCL 50 MG
50 CAPSULE ORAL 2 TIMES DAILY
Qty: 120 CAPSULE | Refills: 1 | Status: SHIPPED | OUTPATIENT
Start: 2024-10-16

## 2024-10-16 ASSESSMENT — PATIENT HEALTH QUESTIONNAIRE - PHQ9
2. FEELING DOWN, DEPRESSED OR HOPELESS: NOT AT ALL
1. LITTLE INTEREST OR PLEASURE IN DOING THINGS: NOT AT ALL
SUM OF ALL RESPONSES TO PHQ9 QUESTIONS 1 AND 2: 0

## 2024-10-16 ASSESSMENT — ACTIVITIES OF DAILY LIVING (ADL)
DOING_HOUSEWORK: INDEPENDENT
GROCERY_SHOPPING: NEEDS ASSISTANCE
DRESSING: INDEPENDENT
BATHING: INDEPENDENT
TAKING_MEDICATION: INDEPENDENT
MANAGING_FINANCES: INDEPENDENT

## 2024-10-16 ASSESSMENT — ENCOUNTER SYMPTOMS
DEPRESSION: 0
LOSS OF SENSATION IN FEET: 0
OCCASIONAL FEELINGS OF UNSTEADINESS: 1

## 2024-10-16 NOTE — ASSESSMENT & PLAN NOTE
-Dexa scan declined today.  -Immunizations: High dose influenza vaccine given today. COVID, Shingrix, RSV to get at her local pharmacy.   -Eye exam was several years ago.  -Hearing is mildly declining.  Referral to audiologist declined today.   -mammogram- last was in 2013. Declined.

## 2024-10-16 NOTE — PROGRESS NOTES
Subjective   Patient ID: Bailey Del Rosario is a 84 y.o. female who presents for Medicare Annual Wellness Visit Initial.  HPI  The patient is a 84 years of,  female with a history of lung cancer s/p left upper lobectomy '93 and chemotherapy, asthma, COPD, CHF w/ EF 30- 35%, severe pulmonary hypertension (67 mmHg), depression, diabetes mellitus II, hypercholesterolemia, hypertension, renal insufficiency, who is here for:     1- MWV.  -Dexa scan declined today.  -Immunizations: High dose influenza vaccine given today. COVID, Shingrix, RSV to get at her local pharmacy.   -Eye exam was several years ago.  -Hearing is mildly declining.  Referral to audiologist declined today.   -mammogram- last was in 2013. Declined.     2- Pain medication office visit and refills.  -UDS and CSA done today.     A review of system was completed.  All systems were reviewed and were normal, except for the ones that are listed in the HPI.    Objective   Physical Exam    Assessment/Plan   Problem List Items Addressed This Visit       Acute exacerbation of COPD with asthma    Relevant Medications    diphenhydrAMINE (Banophen) 50 mg capsule    Diabetes mellitus type 2 without retinopathy (Multi)    Relevant Orders    Comprehensive Metabolic Panel    Hemoglobin A1C    Lipid Panel    TSH with reflex to Free T4 if abnormal    CBC    Vitamin D 25-Hydroxy,Total (for eval of Vitamin D levels)    Albumin-Creatinine Ratio, Urine Random    Iron deficiency anemia    Relevant Medications    ferrous sulfate, 325 mg ferrous sulfate, (FeroSuL) tablet    Chronic low back pain without sciatica     -UDS and CSA done today.   -Hydrocodone/ APAP, ferrous sulfate and Benadryl  refilled today.          Relevant Medications    HYDROcodone-acetaminophen (Norco) 7.5-325 mg tablet    Other Relevant Orders    Drug Screen, Urine With Reflex to Confirmation    Amphetamine Confirm, Urine    Fentanyl Confirmation, Urine    Medication management - Primary     Relevant Orders    Tramadol Confirmation, Urine    Medicare annual wellness visit, subsequent     -Dexa scan declined today.  -Immunizations: High dose influenza vaccine given today. COVID, Shingrix, RSV to get at her local pharmacy.   -Eye exam was several years ago.  -Hearing is mildly declining.  Referral to audiologist declined today.   -mammogram- last was in 2013. Declined.          Relevant Orders    Comprehensive Metabolic Panel    Hemoglobin A1C    Lipid Panel    TSH with reflex to Free T4 if abnormal    CBC    Vitamin D 25-Hydroxy,Total (for eval of Vitamin D levels)    Albumin-Creatinine Ratio, Urine Random     Other Visit Diagnoses       Vitamin D deficiency        Relevant Orders    Vitamin D 25-Hydroxy,Total (for eval of Vitamin D levels)         Patient to return to office in 6 months for routine care.

## 2024-11-04 DIAGNOSIS — E11.9 DIABETES MELLITUS TYPE 2 WITHOUT RETINOPATHY (MULTI): ICD-10-CM

## 2024-11-05 RX ORDER — BLOOD SUGAR DIAGNOSTIC
STRIP MISCELLANEOUS
Qty: 100 STRIP | Refills: 3 | Status: SHIPPED | OUTPATIENT
Start: 2024-11-05

## 2024-11-06 DIAGNOSIS — M25.532 CHRONIC PAIN OF LEFT WRIST: ICD-10-CM

## 2024-11-06 DIAGNOSIS — G89.29 CHRONIC PAIN OF LEFT WRIST: ICD-10-CM

## 2024-11-06 DIAGNOSIS — E11.9 DIABETES MELLITUS TYPE 2 WITHOUT RETINOPATHY (MULTI): ICD-10-CM

## 2024-11-12 RX ORDER — DICLOFENAC SODIUM 10 MG/G
4 GEL TOPICAL 4 TIMES DAILY
Qty: 100 G | Refills: 3 | Status: SHIPPED | OUTPATIENT
Start: 2024-11-12

## 2024-11-12 RX ORDER — ROSUVASTATIN CALCIUM 40 MG/1
40 TABLET, COATED ORAL NIGHTLY
Qty: 90 TABLET | Refills: 1 | Status: SHIPPED | OUTPATIENT
Start: 2024-11-12

## 2024-11-12 RX ORDER — SITAGLIPTIN 100 MG/1
100 TABLET, FILM COATED ORAL DAILY
Qty: 30 TABLET | Refills: 1 | Status: SHIPPED | OUTPATIENT
Start: 2024-11-12

## 2024-12-11 DIAGNOSIS — E11.9 DIABETES MELLITUS TYPE 2 WITHOUT RETINOPATHY (MULTI): ICD-10-CM

## 2024-12-11 DIAGNOSIS — K21.9 GASTROESOPHAGEAL REFLUX DISEASE WITHOUT ESOPHAGITIS: ICD-10-CM

## 2024-12-11 DIAGNOSIS — I42.9 CARDIOMYOPATHY, UNSPECIFIED TYPE (MULTI): ICD-10-CM

## 2024-12-11 DIAGNOSIS — I10 UNCONTROLLED HYPERTENSION: ICD-10-CM

## 2024-12-13 RX ORDER — ISOSORBIDE MONONITRATE 30 MG/1
30 TABLET, EXTENDED RELEASE ORAL DAILY
Qty: 90 TABLET | Refills: 1 | Status: SHIPPED | OUTPATIENT
Start: 2024-12-13

## 2024-12-13 RX ORDER — CARVEDILOL 12.5 MG/1
12.5 TABLET ORAL 2 TIMES DAILY
Qty: 180 TABLET | Refills: 1 | Status: SHIPPED | OUTPATIENT
Start: 2024-12-13

## 2024-12-13 RX ORDER — FUROSEMIDE 40 MG/1
40 TABLET ORAL DAILY
Qty: 90 TABLET | Refills: 1 | Status: SHIPPED | OUTPATIENT
Start: 2024-12-13

## 2024-12-13 RX ORDER — MONTELUKAST SODIUM 10 MG/1
10 TABLET ORAL DAILY
Qty: 90 TABLET | Refills: 1 | Status: SHIPPED | OUTPATIENT
Start: 2024-12-13

## 2024-12-13 RX ORDER — AMLODIPINE BESYLATE 10 MG/1
10 TABLET ORAL DAILY
Qty: 90 TABLET | Refills: 1 | Status: SHIPPED | OUTPATIENT
Start: 2024-12-13

## 2025-01-03 DIAGNOSIS — E11.9 DIABETES MELLITUS TYPE 2 WITHOUT RETINOPATHY (MULTI): ICD-10-CM

## 2025-01-05 RX ORDER — POTASSIUM CHLORIDE 750 MG/1
10 TABLET, EXTENDED RELEASE ORAL DAILY
Qty: 90 TABLET | Refills: 0 | Status: SHIPPED | OUTPATIENT
Start: 2025-01-05

## 2025-01-05 RX ORDER — SITAGLIPTIN 100 MG/1
100 TABLET, FILM COATED ORAL DAILY
Qty: 90 TABLET | Refills: 0 | Status: SHIPPED | OUTPATIENT
Start: 2025-01-05

## 2025-02-12 ENCOUNTER — TELEMEDICINE (OUTPATIENT)
Dept: PRIMARY CARE | Facility: CLINIC | Age: 85
End: 2025-02-12
Payer: MEDICARE

## 2025-02-12 DIAGNOSIS — J44.1 CHRONIC OBSTRUCTIVE PULMONARY DISEASE WITH ACUTE EXACERBATION (MULTI): ICD-10-CM

## 2025-02-12 DIAGNOSIS — J44.1 ACUTE EXACERBATION OF COPD WITH ASTHMA: Primary | ICD-10-CM

## 2025-02-12 PROCEDURE — G2211 COMPLEX E/M VISIT ADD ON: HCPCS | Performed by: FAMILY MEDICINE

## 2025-02-12 PROCEDURE — 99214 OFFICE O/P EST MOD 30 MIN: CPT | Performed by: FAMILY MEDICINE

## 2025-02-12 RX ORDER — INSULIN LISPRO 100 [IU]/ML
INJECTION, SOLUTION INTRAVENOUS; SUBCUTANEOUS
Qty: 15 ML | Refills: 1 | Status: SHIPPED | OUTPATIENT
Start: 2025-02-12

## 2025-02-12 RX ORDER — INSULIN LISPRO 100 [IU]/ML
INJECTION, SOLUTION INTRAVENOUS; SUBCUTANEOUS
COMMUNITY
Start: 2015-02-13 | End: 2025-02-12 | Stop reason: SDUPTHER

## 2025-02-12 RX ORDER — DOXYCYCLINE 100 MG/1
100 CAPSULE ORAL 2 TIMES DAILY
Qty: 20 CAPSULE | Refills: 0 | Status: SHIPPED | OUTPATIENT
Start: 2025-02-12 | End: 2025-02-22

## 2025-02-12 RX ORDER — PREDNISONE 10 MG/1
TABLET ORAL
COMMUNITY
Start: 2022-01-04

## 2025-02-12 RX ORDER — PREDNISONE 10 MG/1
TABLET ORAL
Qty: 42 TABLET | Refills: 0 | Status: SHIPPED | OUTPATIENT
Start: 2025-02-12 | End: 2025-02-24

## 2025-02-12 NOTE — ASSESSMENT & PLAN NOTE
-Not controlled.  -Prednisone taper over 12 days started today.  -Doxycycline 100 mg daily refilled.  -Oxygen as needed recommended.  -Insulin sliding scale refilled today.

## 2025-02-12 NOTE — PROGRESS NOTES
Subjective   Patient ID: Bailey Del Rosario is a 84 y.o. female who presents for cough and  wheezing.   HPI  The patient is a 84 years of,  female with a history of lung cancer s/p left upper lobectomy '93 and chemotherapy, asthma, COPD, CHF w/ EF 30- 35%, severe pulmonary hypertension (67 mmHg), depression, diabetes mellitus II, hypercholesterolemia, hypertension, renal insufficiency, who is here for:     1-acute COPD/asthma exacerbation.  Patient started feeling sick 3 to 2 days ago and had a negative home COVID screening test yesterday.     A review of system was completed.  All systems were reviewed and were normal, except for the ones that are listed in the HPI.    Objective   Physical Exam    Assessment/Plan   Problem List Items Addressed This Visit       Acute exacerbation of COPD with asthma - Primary     -Not controlled.  -Prednisone taper over 12 days started today.  -Doxycycline 100 mg daily refilled.  -Oxygen as needed recommended.  -Insulin sliding scale refilled today.         Relevant Medications    predniSONE (Deltasone) 10 mg tablet    doxycycline (Vibramycin) 100 mg capsule    insulin lispro (HumaLOG KwikPen Insulin) 100 unit/mL injection    COPD (chronic obstructive pulmonary disease) (Multi)    Relevant Medications    predniSONE (Deltasone) 10 mg tablet    doxycycline (Vibramycin) 100 mg capsule    insulin lispro (HumaLOG KwikPen Insulin) 100 unit/mL injection    Patient to return to office if not controlled within 1 week.

## 2025-03-05 DIAGNOSIS — G89.29 CHRONIC PAIN OF LEFT WRIST: ICD-10-CM

## 2025-03-05 DIAGNOSIS — M25.532 CHRONIC PAIN OF LEFT WRIST: ICD-10-CM

## 2025-03-05 DIAGNOSIS — E11.9 DIABETES MELLITUS TYPE 2 WITHOUT RETINOPATHY (MULTI): ICD-10-CM

## 2025-03-07 RX ORDER — BLOOD SUGAR DIAGNOSTIC
STRIP MISCELLANEOUS
Qty: 100 STRIP | Refills: 3 | Status: SHIPPED | OUTPATIENT
Start: 2025-03-07

## 2025-03-07 RX ORDER — DICLOFENAC SODIUM 10 MG/G
4 GEL TOPICAL 4 TIMES DAILY
Qty: 100 G | Refills: 3 | Status: SHIPPED | OUTPATIENT
Start: 2025-03-07

## 2025-03-17 ENCOUNTER — OFFICE VISIT (OUTPATIENT)
Dept: URGENT CARE | Age: 85
End: 2025-03-17
Payer: MEDICARE

## 2025-03-17 VITALS
OXYGEN SATURATION: 94 % | HEART RATE: 78 BPM | SYSTOLIC BLOOD PRESSURE: 143 MMHG | BODY MASS INDEX: 26.95 KG/M2 | WEIGHT: 138 LBS | RESPIRATION RATE: 16 BRPM | DIASTOLIC BLOOD PRESSURE: 73 MMHG

## 2025-03-17 DIAGNOSIS — R21 RASH: Primary | ICD-10-CM

## 2025-03-17 DIAGNOSIS — E11.9 DIABETES MELLITUS TYPE 2 WITHOUT RETINOPATHY (MULTI): ICD-10-CM

## 2025-03-17 PROCEDURE — 99213 OFFICE O/P EST LOW 20 MIN: CPT | Performed by: PERSONAL EMERGENCY RESPONSE ATTENDANT

## 2025-03-17 PROCEDURE — 1126F AMNT PAIN NOTED NONE PRSNT: CPT | Performed by: PERSONAL EMERGENCY RESPONSE ATTENDANT

## 2025-03-17 PROCEDURE — 3078F DIAST BP <80 MM HG: CPT | Performed by: PERSONAL EMERGENCY RESPONSE ATTENDANT

## 2025-03-17 PROCEDURE — 1036F TOBACCO NON-USER: CPT | Performed by: PERSONAL EMERGENCY RESPONSE ATTENDANT

## 2025-03-17 PROCEDURE — 3077F SYST BP >= 140 MM HG: CPT | Performed by: PERSONAL EMERGENCY RESPONSE ATTENDANT

## 2025-03-17 PROCEDURE — 1159F MED LIST DOCD IN RCRD: CPT | Performed by: PERSONAL EMERGENCY RESPONSE ATTENDANT

## 2025-03-17 RX ORDER — DIPHENHYDRAMINE HCL 25 MG
25 TABLET ORAL NIGHTLY PRN
Qty: 30 TABLET | Refills: 0 | Status: SHIPPED | OUTPATIENT
Start: 2025-03-17 | End: 2025-04-16

## 2025-03-17 RX ORDER — DIAPER,BRIEF,INFANT-TODD,DISP
EACH MISCELLANEOUS 2 TIMES DAILY
Qty: 30 G | Refills: 0 | Status: SHIPPED | OUTPATIENT
Start: 2025-03-17 | End: 2025-03-31

## 2025-03-17 ASSESSMENT — ENCOUNTER SYMPTOMS
CONSTITUTIONAL NEGATIVE: 1
COLOR CHANGE: 0
WOUND: 0

## 2025-03-17 ASSESSMENT — PAIN SCALES - GENERAL: PAINLEVEL_OUTOF10: 0-NO PAIN

## 2025-03-17 NOTE — PROGRESS NOTES
Subjective   Patient ID: Bailey Del Rosario is a 84 y.o. female. They present today with a chief complaint of Rash (All over body started a few days ago, itchy ).    History of Present Illness  84-year-old female who comes in today with a chief complaint of rash.  Rash appears to be primarily located on her arms and her neck.  She does have it to a lesser degree on her back.  The legs appear to be spared, but also appear to be dry.  She presents here with her daughter.  They both state that the patient has had this problem for the last several days.  The only thing that they can think of that has changed in the last few days, are dryer sheets.  There are no new medications, soaps, perfumes, shampoos or any other thing that they can think of that may have caused this problem.  The patient does describe this as an itching sensation that she has been scratching to the point of causing bleeding.  She denies any fever/chills, nausea/vomiting/diarrhea, any other illness.      Rash        Past Medical History  Allergies as of 03/17/2025 - Reviewed 03/17/2025   Allergen Reaction Noted    Bee venom protein (honey bee) Anaphylaxis 09/07/2023    Codeine Anaphylaxis 09/07/2023    Iodinated contrast media Anaphylaxis 09/07/2023    Shellfish containing products Hives and Shortness of breath 09/07/2023    Iodine Angioedema and Other 09/07/2023    Levofloxacin Seizure 09/07/2023    Tramadol Seizure 09/07/2023       (Not in a hospital admission)       Past Medical History:   Diagnosis Date    Acute kidney failure, unspecified ([facility]) 07/22/2014    Acute kidney injury    Acute on chronic systolic (congestive) heart failure 07/22/2014    Acute on chronic systolic congestive heart failure    Acute sinusitis, unspecified     Acute sinusitis    Anxiety disorder, unspecified 07/03/2014    Anxiety    Asymptomatic menopausal state 09/09/2019    Asymptomatic menopause    Chronic obstructive pulmonary disease with (acute) exacerbation  (Multi) 06/11/2014    COPD with exacerbation    Chronic obstructive pulmonary disease, unspecified 07/03/2014    Chronic obstructive pulmonary disease    Combined forms of age-related cataract, bilateral 06/20/2016    Combined form of age-related cataract, both eyes    Combined forms of age-related cataract, left eye 08/18/2016    Combined form of age-related cataract, left eye    Combined forms of age-related cataract, right eye 07/22/2016    Combined form of age-related cataract, right eye    Dependence on supplemental oxygen     On home O2    Diarrhea, unspecified 08/09/2016    Acute diarrhea    Dyspnea, unspecified 07/16/2014    Dyspnea    Flushing 02/21/2014    Hot flashes    Gout, unspecified 03/14/2019    Acute gout    Hyperglycemia, unspecified 04/07/2014    Steroid-induced hyperglycemia    Localized edema 07/03/2014    Edema of leg    Low back pain, unspecified 06/08/2016    Acute low back pain    Other amnesia     Memory loss    Other conditions influencing health status     Blepharitis In The Left Eye    Personal history of other diseases of the circulatory system 12/15/2021    History of hypertension    Personal history of other diseases of the circulatory system 07/03/2014    Personal history of pulmonary hypertension    Personal history of other diseases of the circulatory system     History of hypertension    Personal history of other diseases of the circulatory system 07/18/2017    History of uncontrolled hypertension    Personal history of other diseases of the musculoskeletal system and connective tissue 11/01/2021    History of arthritis    Personal history of other diseases of the musculoskeletal system and connective tissue     Personal history of gout    Personal history of other diseases of the nervous system and sense organs 09/24/2016    History of bacterial conjunctivitis    Personal history of other diseases of the respiratory system 09/15/2015    History of acute sinusitis    Personal  history of other diseases of the respiratory system 01/04/2022    History of acute bronchitis    Personal history of other diseases of the respiratory system     Personal history of asthma    Personal history of other diseases of the respiratory system 12/15/2021    History of allergic rhinitis    Personal history of other diseases of the respiratory system     History of acute bronchitis    Personal history of other diseases of urinary system 09/19/2016    History of decreased renal function    Personal history of other endocrine, nutritional and metabolic disease 12/15/2021    History of hypercholesterolemia    Personal history of other endocrine, nutritional and metabolic disease 01/09/2017    History of diabetes mellitus    Personal history of other endocrine, nutritional and metabolic disease 04/07/2014    History of hyperglycemia    Personal history of other endocrine, nutritional and metabolic disease     History of type 2 diabetes mellitus    Personal history of other mental and behavioral disorders     History of depression    Personal history of other specified conditions 01/09/2017    History of abnormal weight loss    Personal history of other specified conditions 10/30/2017    History of dizziness    Personal history of other specified conditions 08/01/2016    History of diarrhea    Personal history of other specified conditions 07/03/2014    History of shortness of breath    Presence of intraocular lens 12/12/2019    Pseudophakia of left eye    Unilateral primary osteoarthritis, right hip 12/04/2013    Primary osteoarthritis of right hip    Urgency of urination     Urinary urgency    Urinary tract infection, site not specified 04/11/2016    Acute UTI       Past Surgical History:   Procedure Laterality Date    HYSTERECTOMY  11/18/2013    Hysterectomy    LUNG LOBECTOMY  11/18/2013    Lung Lobectomy    OTHER SURGICAL HISTORY  11/18/2013    Craniotomy (Therapeutic)    OTHER SURGICAL HISTORY  12/15/2021     Cataract surgery        reports that she has never smoked. She has never used smokeless tobacco. She reports that she does not currently use alcohol. She reports that she does not use drugs.    Review of Systems  Review of Systems   Constitutional: Negative.    HENT: Negative.     Skin:  Positive for rash. Negative for color change, pallor and wound.   All other systems reviewed and are negative.                                 Objective    Vitals:    03/17/25 1316   BP: 143/73   Pulse: 78   Resp: 16   SpO2: 94%   Weight: 62.6 kg (138 lb)     No LMP recorded (lmp unknown). Patient is postmenopausal.    Physical Exam  Vitals and nursing note reviewed.   Constitutional:       Appearance: Normal appearance.   HENT:      Head: Normocephalic and atraumatic.   Skin:     Findings: Rash present. Rash is urticarial.      Comments: Patient does have a an urticarial rash noted primarily on her arms and the anterior portion of her neck and upper chest.  She does have it on her lower back as well.  There is some small amount of dried blood seen where she has been consistently scratching it.  There appears to be no signs of erythema, infection.   Neurological:      Mental Status: She is alert.         Procedures    Point of Care Test & Imaging Results from this visit  No results found for this visit on 03/17/25.   No results found.    Diagnostic study results (if any) were reviewed by Amos Pringle PA-C.    Assessment/Plan   Allergies, medications, history, and pertinent labs/EKGs/Imaging reviewed by Amos Pringle PA-C.     Medical Decision Making  84-year-old female who comes in today with a chief complaint of rash.  She does present here with her daughter.  We did discuss possible causes of the rash and determined that the only new thing in their house happens to be dryer sheets.  I advised them to eliminate the dryer sheets and wash all of the close that were exposed to the dryer sheets.  She did take Benadryl x 1 and  was encouraged to continue taking Benadryl for symptom relief.  I will give her a prescription for Benadryl as well as hydrocortisone cream.  Patient and family were both advised to watch the areas is much as possible after the application of the pills and the cream to make sure that her symptoms and her rash were improving, otherwise they are to return to the emergency room for further examination.  Patient is stable for discharge and request to go home.  Discharge instructions will be given.    Orders and Diagnoses  Diagnoses and all orders for this visit:  Rash  -     hydrocortisone 0.5 % cream; Apply topically 2 times a day for 14 days.  -     diphenhydrAMINE (Sominex) 25 mg tablet; Take 1 tablet (25 mg) by mouth as needed at bedtime for sleep.      Medical Admin Record      Patient disposition: Home    Electronically signed by Amos Pringle PA-C  1:49 PM

## 2025-03-20 RX ORDER — BLOOD SUGAR DIAGNOSTIC
STRIP MISCELLANEOUS
Qty: 100 STRIP | Refills: 3 | Status: SHIPPED | OUTPATIENT
Start: 2025-03-20

## 2025-03-21 DIAGNOSIS — J44.1 ACUTE EXACERBATION OF COPD WITH ASTHMA: ICD-10-CM

## 2025-03-21 DIAGNOSIS — J44.1 CHRONIC OBSTRUCTIVE PULMONARY DISEASE WITH ACUTE EXACERBATION (MULTI): ICD-10-CM

## 2025-03-25 ENCOUNTER — APPOINTMENT (OUTPATIENT)
Dept: PRIMARY CARE | Facility: CLINIC | Age: 85
End: 2025-03-25
Payer: MEDICARE

## 2025-03-25 VITALS
BODY MASS INDEX: 26.83 KG/M2 | TEMPERATURE: 96.6 F | SYSTOLIC BLOOD PRESSURE: 165 MMHG | DIASTOLIC BLOOD PRESSURE: 80 MMHG | HEART RATE: 60 BPM | WEIGHT: 137.4 LBS

## 2025-03-25 DIAGNOSIS — G89.29 CHRONIC LOW BACK PAIN WITHOUT SCIATICA, UNSPECIFIED BACK PAIN LATERALITY: ICD-10-CM

## 2025-03-25 DIAGNOSIS — B37.2 CANDIDAL INTERTRIGO: ICD-10-CM

## 2025-03-25 DIAGNOSIS — M54.50 CHRONIC LOW BACK PAIN WITHOUT SCIATICA, UNSPECIFIED BACK PAIN LATERALITY: ICD-10-CM

## 2025-03-25 DIAGNOSIS — R21 RASH: Primary | ICD-10-CM

## 2025-03-25 PROCEDURE — 3077F SYST BP >= 140 MM HG: CPT | Performed by: FAMILY MEDICINE

## 2025-03-25 PROCEDURE — 1159F MED LIST DOCD IN RCRD: CPT | Performed by: FAMILY MEDICINE

## 2025-03-25 PROCEDURE — G2211 COMPLEX E/M VISIT ADD ON: HCPCS | Performed by: FAMILY MEDICINE

## 2025-03-25 PROCEDURE — 99213 OFFICE O/P EST LOW 20 MIN: CPT | Performed by: FAMILY MEDICINE

## 2025-03-25 PROCEDURE — 1124F ACP DISCUSS-NO DSCNMKR DOCD: CPT | Performed by: FAMILY MEDICINE

## 2025-03-25 PROCEDURE — 1126F AMNT PAIN NOTED NONE PRSNT: CPT | Performed by: FAMILY MEDICINE

## 2025-03-25 PROCEDURE — 3078F DIAST BP <80 MM HG: CPT | Performed by: FAMILY MEDICINE

## 2025-03-25 RX ORDER — DIPHENHYDRAMINE HCL 25 MG
25 TABLET ORAL NIGHTLY PRN
Qty: 90 TABLET | Refills: 3 | Status: SHIPPED | OUTPATIENT
Start: 2025-03-25 | End: 2025-04-24

## 2025-03-25 RX ORDER — HYDROCODONE BITARTRATE AND ACETAMINOPHEN 7.5; 325 MG/1; MG/1
1 TABLET ORAL EVERY 6 HOURS PRN
Qty: 90 TABLET | Refills: 0 | Status: SHIPPED | OUTPATIENT
Start: 2025-03-25

## 2025-03-25 RX ORDER — FLUCONAZOLE 150 MG/1
150 TABLET ORAL ONCE
Qty: 2 TABLET | Refills: 0 | Status: SHIPPED | OUTPATIENT
Start: 2025-03-25 | End: 2025-03-25

## 2025-03-25 ASSESSMENT — PATIENT HEALTH QUESTIONNAIRE - PHQ9
2. FEELING DOWN, DEPRESSED OR HOPELESS: NOT AT ALL
1. LITTLE INTEREST OR PLEASURE IN DOING THINGS: NOT AT ALL
SUM OF ALL RESPONSES TO PHQ9 QUESTIONS 1 & 2: 0

## 2025-03-25 ASSESSMENT — PAIN SCALES - GENERAL: PAINLEVEL_OUTOF10: 0-NO PAIN

## 2025-03-25 NOTE — PROGRESS NOTES
Subjective   Patient ID: Bailey Del Rosario is a 84 y.o. female who presents for Rash.  Rash    The patient is a 84 years of,  female with a history of lung cancer s/p left upper lobectomy '93 and chemotherapy, asthma, COPD, CHF w/ EF 30- 35%, severe pulmonary hypertension (67 mmHg), depression, diabetes mellitus II, hypercholesterolemia, hypertension, renal insufficiency, who is here for:     1- The patient is here for an Urgent care follow-up visit from 3/17/2025.  She was treated for a skin rash with Benadryl PO and HC cream 1% BID PRN.  The rash is improving and she is requesting a refill of her Benadryl.     2- breast skin folds irritation and pruritus that started 2- 3 weeks ago.  Her last dose of PO Steroids was a montha go.       A review of system was completed.  All systems were reviewed and were normal, except for the ones that are listed in the HPI.    Objective   Physical Exam  Constitutional:       Appearance: Normal appearance.   HENT:      Head: Normocephalic and atraumatic.      Right Ear: Tympanic membrane, ear canal and external ear normal.      Left Ear: Tympanic membrane, ear canal and external ear normal.      Nose: Nose normal.      Mouth/Throat:      Mouth: Mucous membranes are moist.      Pharynx: Oropharynx is clear.   Eyes:      Extraocular Movements: Extraocular movements intact.      Conjunctiva/sclera: Conjunctivae normal.      Pupils: Pupils are equal, round, and reactive to light.   Cardiovascular:      Rate and Rhythm: Normal rate and regular rhythm.      Pulses: Normal pulses.   Pulmonary:      Effort: Pulmonary effort is normal.      Breath sounds: Normal breath sounds.   Abdominal:      General: Abdomen is flat. Bowel sounds are normal.      Palpations: Abdomen is soft.   Musculoskeletal:         General: Normal range of motion.      Cervical back: Normal range of motion and neck supple.   Skin:     General: Skin is warm.      Comments: breast skin folds irritation  and erythema.   Neurological:      General: No focal deficit present.      Mental Status: She is alert and oriented to person, place, and time. Mental status is at baseline.   Psychiatric:         Mood and Affect: Mood normal.         Behavior: Behavior normal.         Thought Content: Thought content normal.         Judgment: Judgment normal.     Assessment/Plan   Problem List Items Addressed This Visit       Chronic low back pain without sciatica    Relevant Medications    HYDROcodone-acetaminophen (Norco) 7.5-325 mg tablet    Rash - Primary     -Improved.  Benadryl refilled.          Relevant Medications    diphenhydrAMINE (Sominex) 25 mg tablet    Candidal intertrigo     -Fluconazole prescribed.          Relevant Medications    fluconazole (Diflucan) 150 mg tablet    Patient to return to office in 6 months (10/2025) for your MWV.  Sooner as needed.

## 2025-03-26 RX ORDER — INSULIN LISPRO 100 [IU]/ML
INJECTION, SOLUTION INTRAVENOUS; SUBCUTANEOUS
Qty: 15 ML | Refills: 1 | Status: SHIPPED | OUTPATIENT
Start: 2025-03-26

## 2025-04-01 DIAGNOSIS — E11.9 DIABETES MELLITUS TYPE 2 WITHOUT RETINOPATHY (MULTI): ICD-10-CM

## 2025-04-06 RX ORDER — SITAGLIPTIN 100 MG/1
100 TABLET, FILM COATED ORAL DAILY
Qty: 90 TABLET | Refills: 1 | Status: SHIPPED | OUTPATIENT
Start: 2025-04-06

## 2025-04-06 RX ORDER — POTASSIUM CHLORIDE 750 MG/1
10 TABLET, EXTENDED RELEASE ORAL DAILY
Qty: 90 TABLET | Refills: 1 | Status: SHIPPED | OUTPATIENT
Start: 2025-04-06

## 2025-05-03 DIAGNOSIS — J44.1 ACUTE EXACERBATION OF COPD WITH ASTHMA: Primary | ICD-10-CM

## 2025-05-03 RX ORDER — METHYLPREDNISOLONE 4 MG/1
TABLET ORAL
Qty: 42 TABLET | Refills: 0 | Status: SHIPPED | OUTPATIENT
Start: 2025-05-03 | End: 2025-05-09

## 2025-05-03 RX ORDER — DOXYCYCLINE 100 MG/1
100 CAPSULE ORAL 2 TIMES DAILY
Qty: 20 CAPSULE | Refills: 0 | Status: SHIPPED | OUTPATIENT
Start: 2025-05-03 | End: 2025-05-13

## 2025-05-03 NOTE — PROGRESS NOTES
We received a call from the answering service yesterday May 2, 2025.  The patient's daughter stated that her mother was having exacerbation of her asthma.  She requested Medrol Dosepak course along with some antibiotics (doxycycline) test.  Both were prescribed.

## 2025-05-06 RX ORDER — PREDNISONE 10 MG/1
TABLET ORAL
OUTPATIENT
Start: 2025-05-06

## 2025-06-04 DIAGNOSIS — I42.9 CARDIOMYOPATHY, UNSPECIFIED TYPE (MULTI): ICD-10-CM

## 2025-06-04 DIAGNOSIS — E11.9 DIABETES MELLITUS TYPE 2 WITHOUT RETINOPATHY (MULTI): ICD-10-CM

## 2025-06-04 DIAGNOSIS — I10 UNCONTROLLED HYPERTENSION: ICD-10-CM

## 2025-06-04 DIAGNOSIS — K21.9 GASTROESOPHAGEAL REFLUX DISEASE WITHOUT ESOPHAGITIS: ICD-10-CM

## 2025-06-06 RX ORDER — MONTELUKAST SODIUM 10 MG/1
10 TABLET ORAL DAILY
Qty: 90 TABLET | Refills: 1 | Status: SHIPPED | OUTPATIENT
Start: 2025-06-06

## 2025-06-06 RX ORDER — CARVEDILOL 12.5 MG/1
12.5 TABLET ORAL 2 TIMES DAILY
Qty: 180 TABLET | Refills: 1 | Status: SHIPPED | OUTPATIENT
Start: 2025-06-06

## 2025-06-06 RX ORDER — FUROSEMIDE 40 MG/1
40 TABLET ORAL DAILY
Qty: 90 TABLET | Refills: 1 | Status: SHIPPED | OUTPATIENT
Start: 2025-06-06

## 2025-06-06 RX ORDER — AMLODIPINE BESYLATE 10 MG/1
10 TABLET ORAL DAILY
Qty: 90 TABLET | Refills: 1 | Status: SHIPPED | OUTPATIENT
Start: 2025-06-06

## 2025-06-06 RX ORDER — ISOSORBIDE MONONITRATE 30 MG/1
30 TABLET, EXTENDED RELEASE ORAL DAILY
Qty: 90 TABLET | Refills: 1 | Status: SHIPPED | OUTPATIENT
Start: 2025-06-06

## 2025-08-07 DIAGNOSIS — M25.532 CHRONIC PAIN OF LEFT WRIST: ICD-10-CM

## 2025-08-07 DIAGNOSIS — G89.29 CHRONIC PAIN OF LEFT WRIST: ICD-10-CM

## 2025-08-09 RX ORDER — DICLOFENAC SODIUM 10 MG/G
4 GEL TOPICAL 4 TIMES DAILY
Qty: 100 G | Refills: 3 | Status: SHIPPED | OUTPATIENT
Start: 2025-08-09